# Patient Record
Sex: FEMALE | Race: WHITE | Employment: FULL TIME | URBAN - METROPOLITAN AREA
[De-identification: names, ages, dates, MRNs, and addresses within clinical notes are randomized per-mention and may not be internally consistent; named-entity substitution may affect disease eponyms.]

---

## 2017-08-11 PROCEDURE — 88175 CYTOPATH C/V AUTO FLUID REDO: CPT | Performed by: OBSTETRICS & GYNECOLOGY

## 2017-08-11 PROCEDURE — 87624 HPV HI-RISK TYP POOLED RSLT: CPT | Performed by: OBSTETRICS & GYNECOLOGY

## 2022-03-22 ENCOUNTER — OFFICE VISIT (OUTPATIENT)
Dept: RHEUMATOLOGY | Facility: CLINIC | Age: 47
End: 2022-03-22
Payer: COMMERCIAL

## 2022-03-22 ENCOUNTER — HOSPITAL ENCOUNTER (OUTPATIENT)
Dept: GENERAL RADIOLOGY | Age: 47
Discharge: HOME | End: 2022-03-22
Attending: INTERNAL MEDICINE
Payer: COMMERCIAL

## 2022-03-22 ENCOUNTER — LAB ENCOUNTER (OUTPATIENT)
Dept: LAB | Age: 47
End: 2022-03-22
Attending: INTERNAL MEDICINE
Payer: COMMERCIAL

## 2022-03-22 VITALS
SYSTOLIC BLOOD PRESSURE: 117 MMHG | HEART RATE: 76 BPM | HEIGHT: 67.5 IN | DIASTOLIC BLOOD PRESSURE: 80 MMHG | BODY MASS INDEX: 28.39 KG/M2 | WEIGHT: 183 LBS

## 2022-03-22 DIAGNOSIS — G89.29 CHRONIC BILATERAL LOW BACK PAIN WITHOUT SCIATICA: ICD-10-CM

## 2022-03-22 DIAGNOSIS — M54.50 CHRONIC BILATERAL LOW BACK PAIN WITHOUT SCIATICA: ICD-10-CM

## 2022-03-22 DIAGNOSIS — G89.29 HEEL PAIN, CHRONIC, RIGHT: ICD-10-CM

## 2022-03-22 DIAGNOSIS — M54.50 CHRONIC BILATERAL LOW BACK PAIN WITHOUT SCIATICA: Primary | ICD-10-CM

## 2022-03-22 DIAGNOSIS — M79.18 DIFFUSE MYOFASCIAL PAIN SYNDROME: ICD-10-CM

## 2022-03-22 DIAGNOSIS — M79.671 HEEL PAIN, CHRONIC, RIGHT: ICD-10-CM

## 2022-03-22 DIAGNOSIS — G89.29 CHRONIC BILATERAL LOW BACK PAIN WITHOUT SCIATICA: Primary | ICD-10-CM

## 2022-03-22 PROCEDURE — 99204 OFFICE O/P NEW MOD 45 MIN: CPT | Performed by: INTERNAL MEDICINE

## 2022-03-22 PROCEDURE — 3074F SYST BP LT 130 MM HG: CPT | Performed by: INTERNAL MEDICINE

## 2022-03-22 PROCEDURE — 72110 X-RAY EXAM L-2 SPINE 4/>VWS: CPT | Performed by: INTERNAL MEDICINE

## 2022-03-22 PROCEDURE — 72202 X-RAY EXAM SI JOINTS 3/> VWS: CPT | Performed by: INTERNAL MEDICINE

## 2022-03-22 PROCEDURE — 3008F BODY MASS INDEX DOCD: CPT | Performed by: INTERNAL MEDICINE

## 2022-03-22 PROCEDURE — 3079F DIAST BP 80-89 MM HG: CPT | Performed by: INTERNAL MEDICINE

## 2022-03-22 PROCEDURE — 73620 X-RAY EXAM OF FOOT: CPT | Performed by: INTERNAL MEDICINE

## 2022-03-22 RX ORDER — GABAPENTIN 100 MG/1
CAPSULE ORAL
Qty: 60 CAPSULE | Refills: 2 | Status: SHIPPED | OUTPATIENT
Start: 2022-03-22

## 2022-03-22 NOTE — PROGRESS NOTES
Dear Dr. Hernandez Client:    I saw your patient Vitaliy Anderson in consultation this afternoon at your request, for evaluation of musculoskeletal discomfort. As you know, she is a 41-year-old woman whose problems started about 6 months ago. After sitting, it is very hard to stand and walk. Her legs are very stiff. Her hands feel swollen. They can be numb and tingly at night. She has pain of her medial elbows and knees. She has low back pain. For 3 months, she has had pain under her right medial heel, which can be very sharp and severe. At night she cannot lie on her back. She lies on her side, with a pillow between her knees. She tosses and turns. Her legs are restless. She can have electric-like shocks going through her body. She is not refreshed on awakening, and her energy is very low. When she gets out of bed in the morning her legs are stiff, as is her low back. It is harder to bend forward. Her hips are tight. She does not see swollen joints. She loosens, does well for 4 hours, and then the pain comes back. It can feel like the 'flu' from head to toe, in her muscles and joints. She can be tender to touch in different areas. Diclofenac caused severe hives and allergic rash. Labs were done March 5th of 2022. CBC and CMP were normal.  Hemoglobin A1c was 5.8.  TSH normal.  Urinalysis possible urinary tract infection. Folate and B12 normal.  25 hydroxy vitamin D 30. Rheumatoid factor negative. SSA and SSB antibodies negative. POOJA negative. Sed rate 2 and C-reactive protein normal.    Past medical history:  She is perimenopausal.  She has had prediabetes. She has borderline high cholesterol. She chronically will get hives and her eyes will swell since age 14. A medicine called the last teen is working very well for her angioedema and urticaria. She got this in [place]. She takes calcium and black cohosh. She has lots of drug allergies.     Family history:  Her mother has rheumatoid arthritis and maternal aunt lupus. Social history:  She is  with 2 children. She works in [employer] on her feet and is very active. She walks 5 to 6 miles a day. No cigarettes, alcohol. Coffee in the morning. Review of systems:  No fevers, chills, sweats, anorexia. She has gained 30 pounds in the last 3 years. Occasionally she will break out with sun exposure and the hives all over. No alopecia, internal inflammation, oral or nasal ulcers, of adenopathy. No shortness of breath or chest pain. No acid reflux, stomach pain, nausea or vomiting, constipation or diarrhea, blood in her stools. No trouble urinating. No dry eyes or mouth. No Raynaud's or headache. Physical exam:  Pleasant woman in no acute distress. Blood pressure 117/80, pulse 76, height 5' 7.5\" (1.715 m), weight 183 lb (83 kg). No rash. No alopecia. No conjunctival injection. No nasal oral lesions. No cervical adenopathy. Lungs clear. S1 and S2 regular. Abdomen without hepatosplenomegaly or tenderness. Normal bowel sounds. No lower extremity edema. Neck moves well. Shoulders move normally. Elbows, wrist, and hands look normal.  No obvious swelling in her fingers. Capillary refill is good in her fingertips. Lumbar spine flexion is good. Hips move normally. Knees move well in flexion and extension without effusions. Ankles move well per the balls of her feet are nontender to squeeze. She has myofascial discomfort to palpation over her lateral elbows, medial knees, lateral hips, and the midline of her low back. Assessment and plan:    1. Restless legs, and probable diffuse myofascial pain syndrome. I gave her the article from up-to-date on \"fibromyalgia\". The process was discussed. I may be referring her to physical therapy on return. She will try gabapentin 100 to 200 mg 30 minutes before bedtime. 2.  Rule out autoimmune arthritis. I think it is unlikely, given her negative labs so far.   I ordered an HLA-B27. I ordered x-rays of her SI joints, lumbar spine, and right foot (where she has plantar fasciitis). The results will be reviewed on return in several weeks. 3.  Chronic urticaria and angioedema, doing very well on bilastine (an antihistamine). Thank you for inviting me to participate in her care. Sincerely,      Truong Buitrago MD   Rheumatology.

## 2022-03-23 LAB — HLA-B27 ANTIGEN: NEGATIVE

## 2022-04-06 ENCOUNTER — OFFICE VISIT (OUTPATIENT)
Dept: RHEUMATOLOGY | Facility: CLINIC | Age: 47
End: 2022-04-06
Payer: COMMERCIAL

## 2022-04-06 VITALS
DIASTOLIC BLOOD PRESSURE: 83 MMHG | HEART RATE: 80 BPM | HEIGHT: 67.5 IN | SYSTOLIC BLOOD PRESSURE: 130 MMHG | WEIGHT: 183 LBS | BODY MASS INDEX: 28.39 KG/M2

## 2022-04-06 DIAGNOSIS — M79.671 HEEL PAIN, CHRONIC, RIGHT: ICD-10-CM

## 2022-04-06 DIAGNOSIS — G89.29 HEEL PAIN, CHRONIC, RIGHT: ICD-10-CM

## 2022-04-06 DIAGNOSIS — G89.29 CHRONIC BILATERAL LOW BACK PAIN WITHOUT SCIATICA: Primary | ICD-10-CM

## 2022-04-06 DIAGNOSIS — M54.50 CHRONIC BILATERAL LOW BACK PAIN WITHOUT SCIATICA: Primary | ICD-10-CM

## 2022-04-06 DIAGNOSIS — M79.18 DIFFUSE MYOFASCIAL PAIN SYNDROME: ICD-10-CM

## 2022-04-06 PROCEDURE — 99213 OFFICE O/P EST LOW 20 MIN: CPT | Performed by: INTERNAL MEDICINE

## 2022-04-06 PROCEDURE — 3079F DIAST BP 80-89 MM HG: CPT | Performed by: INTERNAL MEDICINE

## 2022-04-06 PROCEDURE — 3075F SYST BP GE 130 - 139MM HG: CPT | Performed by: INTERNAL MEDICINE

## 2022-04-06 PROCEDURE — 3008F BODY MASS INDEX DOCD: CPT | Performed by: INTERNAL MEDICINE

## 2022-04-06 RX ORDER — MELOXICAM 7.5 MG/1
TABLET ORAL
Qty: 30 TABLET | Refills: 2 | Status: SHIPPED | OUTPATIENT
Start: 2022-04-06

## 2022-04-06 NOTE — PROGRESS NOTES
Dear Dr. Ayala Danger:    I saw your patient Claudette Holiday in follow-up this morning in my rheumatology clinic. She is taking gabapentin 100 mg before bedtime. She is resting much better, and her legs are less restless  She still has significant general pain, heel pain, back pain, etc.    We reviewed her results from March 12th of 2022. HLA B 27 is negative. We reviewed her x-rays on the computer. Right foot x-rays show a large spurs under and behind her calcaneus. There is osteoarthritis at the base of her great toe. SI joint x-rays are unremarkable. Lumbar spine x-rays show mild scoliosis, mild spondylosis. Physical exam:  Pleasant woman in no acute distress. Blood pressure 130/83, pulse 80, height 5' 7.5\" (1.715 m), weight 183 lb (83 kg). Otherwise unchanged. Assessment and plan:    1. Restless legs, diffuse myofascial discomfort. She will continue gabapentin 100 mg before bedtime, which is helping. I referred her to physical therapy for 12 visits. She will follow-up in 1 month. 2.  I reassured her that she does not have ankylosing spondylitis. 3.  Chronic urticaria and angioedema, doing well on bilastine, an antihistamine. 4.  Plantar fasciitis, osteoarthritis. Heel cups. Meloxicam 7.5 mg daily with food. She may need to see podiatry. I referred her to physical therapy. Thank you for the opportunity to participate in her care.       Sincerely,      Monica Gagnon MD \  Rheumatology

## 2022-05-13 ENCOUNTER — TELEPHONE (OUTPATIENT)
Dept: PHYSICAL THERAPY | Facility: HOSPITAL | Age: 47
End: 2022-05-13

## 2022-06-14 ENCOUNTER — APPOINTMENT (OUTPATIENT)
Dept: PHYSICAL THERAPY | Age: 47
End: 2022-06-14
Attending: INTERNAL MEDICINE
Payer: COMMERCIAL

## 2022-06-16 ENCOUNTER — APPOINTMENT (OUTPATIENT)
Dept: PHYSICAL THERAPY | Age: 47
End: 2022-06-16
Attending: INTERNAL MEDICINE
Payer: COMMERCIAL

## 2022-06-20 ENCOUNTER — APPOINTMENT (OUTPATIENT)
Dept: PHYSICAL THERAPY | Age: 47
End: 2022-06-20
Attending: INTERNAL MEDICINE
Payer: COMMERCIAL

## 2022-06-28 ENCOUNTER — APPOINTMENT (OUTPATIENT)
Dept: PHYSICAL THERAPY | Age: 47
End: 2022-06-28
Attending: INTERNAL MEDICINE
Payer: COMMERCIAL

## 2022-06-30 ENCOUNTER — APPOINTMENT (OUTPATIENT)
Dept: PHYSICAL THERAPY | Age: 47
End: 2022-06-30
Attending: INTERNAL MEDICINE
Payer: COMMERCIAL

## 2022-07-04 RX ORDER — MELOXICAM 7.5 MG/1
TABLET ORAL
Qty: 30 TABLET | Refills: 2 | Status: SHIPPED | OUTPATIENT
Start: 2022-07-04

## 2022-07-05 ENCOUNTER — APPOINTMENT (OUTPATIENT)
Dept: PHYSICAL THERAPY | Age: 47
End: 2022-07-05
Attending: INTERNAL MEDICINE
Payer: COMMERCIAL

## 2022-07-07 ENCOUNTER — APPOINTMENT (OUTPATIENT)
Dept: PHYSICAL THERAPY | Age: 47
End: 2022-07-07
Attending: INTERNAL MEDICINE
Payer: COMMERCIAL

## 2022-07-11 ENCOUNTER — APPOINTMENT (OUTPATIENT)
Dept: PHYSICAL THERAPY | Age: 47
End: 2022-07-11
Attending: INTERNAL MEDICINE
Payer: COMMERCIAL

## 2022-09-22 RX ORDER — GABAPENTIN 100 MG/1
CAPSULE ORAL
Qty: 60 CAPSULE | Refills: 2 | Status: SHIPPED | OUTPATIENT
Start: 2022-09-22

## 2022-10-05 ENCOUNTER — APPOINTMENT (OUTPATIENT)
Dept: GENERAL RADIOLOGY | Age: 47
End: 2022-10-05
Attending: EMERGENCY MEDICINE
Payer: COMMERCIAL

## 2022-10-05 ENCOUNTER — HOSPITAL ENCOUNTER (OUTPATIENT)
Age: 47
Discharge: HOME | End: 2022-10-05
Attending: EMERGENCY MEDICINE
Payer: COMMERCIAL

## 2022-10-05 VITALS
HEART RATE: 92 BPM | DIASTOLIC BLOOD PRESSURE: 96 MMHG | SYSTOLIC BLOOD PRESSURE: 150 MMHG | OXYGEN SATURATION: 100 % | TEMPERATURE: 98 F | RESPIRATION RATE: 16 BRPM

## 2022-10-05 DIAGNOSIS — M79.674 GREAT TOE PAIN, RIGHT: Primary | ICD-10-CM

## 2022-10-05 PROCEDURE — 99203 OFFICE O/P NEW LOW 30 MIN: CPT

## 2022-10-05 PROCEDURE — 99213 OFFICE O/P EST LOW 20 MIN: CPT

## 2022-10-05 PROCEDURE — 73660 X-RAY EXAM OF TOE(S): CPT | Performed by: EMERGENCY MEDICINE

## 2022-10-05 RX ORDER — METHYLPREDNISOLONE 4 MG/1
TABLET ORAL
Qty: 1 EACH | Refills: 0 | Status: SHIPPED | OUTPATIENT
Start: 2022-10-05

## 2023-01-05 RX ORDER — GABAPENTIN 100 MG/1
CAPSULE ORAL
Qty: 60 CAPSULE | Refills: 2 | Status: SHIPPED | OUTPATIENT
Start: 2023-01-05

## 2023-01-05 NOTE — TELEPHONE ENCOUNTER
Medication Quantity Refills Start End   gabapentin 100 MG Oral Cap 60 capsule 2 9/22/2022      LOV: 4/6/22   No future appointments.

## 2024-01-19 ENCOUNTER — TELEPHONE (OUTPATIENT)
Dept: RHEUMATOLOGY | Facility: CLINIC | Age: 49
End: 2024-01-19

## 2024-01-19 NOTE — TELEPHONE ENCOUNTER
Dr. Crystal Rodriguez, Primary Care Physician for patient is calling Dr. Estes's office, aware the provider is retired and requesting the patient's diagnosis of fibromalgia be faxed:    Phone      [phone]    Please advise

## 2024-03-15 ENCOUNTER — LAB REQUISITION (OUTPATIENT)
Dept: OBGYN | Age: 49
End: 2024-03-15

## 2024-03-15 DIAGNOSIS — Z12.4 ENCOUNTER FOR SCREENING FOR MALIGNANT NEOPLASM OF CERVIX: ICD-10-CM

## 2024-03-15 PROCEDURE — 87624 HPV HI-RISK TYP POOLED RSLT: CPT | Performed by: CLINICAL MEDICAL LABORATORY

## 2024-03-15 PROCEDURE — 88175 CYTOPATH C/V AUTO FLUID REDO: CPT | Performed by: CLINICAL MEDICAL LABORATORY

## 2024-03-26 LAB
CASE RPRT: ABNORMAL
CLINICAL INFO: ABNORMAL
CYTOLOGY CVX/VAG STUDY: ABNORMAL
CYTOLOGY CVX/VAG STUDY: ABNORMAL
GEN CATEG CVX/VAG CYTO-IMP: ABNORMAL
HPV16+18+45 E6+E7MRNA CVX NAA+PROBE: NEGATIVE
Lab: NORMAL
PAP EDUCATIONAL NOTE: ABNORMAL
SPECIMEN ADEQUACY: ABNORMAL

## 2024-03-27 ENCOUNTER — HOSPITAL ENCOUNTER (OUTPATIENT)
Dept: GENERAL RADIOLOGY | Age: 49
Discharge: HOME | End: 2024-03-27
Attending: INTERNAL MEDICINE
Payer: COMMERCIAL

## 2024-03-27 DIAGNOSIS — M25.511 RIGHT SHOULDER PAIN: ICD-10-CM

## 2024-03-27 PROCEDURE — 73030 X-RAY EXAM OF SHOULDER: CPT | Performed by: INTERNAL MEDICINE
